# Patient Record
Sex: FEMALE | Race: WHITE | NOT HISPANIC OR LATINO | URBAN - NONMETROPOLITAN AREA
[De-identification: names, ages, dates, MRNs, and addresses within clinical notes are randomized per-mention and may not be internally consistent; named-entity substitution may affect disease eponyms.]

---

## 2018-07-19 NOTE — PATIENT DISCUSSION
Continue: Maxitrol (neomycin-polymyxin-dexameth): drops,suspension: 3.5-10,000-0.1 mg/mL-unit/mL-% 1 drop three times a day as directed into left eye 07-

## 2018-07-19 NOTE — PATIENT DISCUSSION
- Discussed that there is no proven benefit to using AREDS2 vitamins and that she may stop them if she wishes

## 2018-07-26 NOTE — PATIENT DISCUSSION
Continue: prednisolone acetate (prednisolone acetate): drops,suspension: 1% 1 drop three times a day as directed into left eye 07-

## 2018-12-31 PROBLEM — H35.372: Noted: 2018-12-31

## 2018-12-31 PROBLEM — H52.4: Noted: 2020-01-20

## 2018-12-31 PROBLEM — H43.813: Noted: 2020-01-20

## 2018-12-31 PROBLEM — Z96.1: Noted: 2020-01-20

## 2018-12-31 PROBLEM — E11.9: Noted: 2018-12-31

## 2018-12-31 PROBLEM — H04.123: Noted: 2018-12-31

## 2018-12-31 PROBLEM — H52.4: Noted: 2018-12-31

## 2018-12-31 PROBLEM — H10.423: Noted: 2020-01-20

## 2018-12-31 PROBLEM — Z96.1: Noted: 2018-12-31

## 2018-12-31 PROBLEM — H35.373: Noted: 2018-12-31

## 2018-12-31 PROBLEM — H43.813: Noted: 2018-12-31

## 2019-07-22 ENCOUNTER — IMPORTED ENCOUNTER (OUTPATIENT)
Dept: URBAN - NONMETROPOLITAN AREA CLINIC 1 | Facility: CLINIC | Age: 69
End: 2019-07-22

## 2019-07-22 PROCEDURE — 92014 COMPRE OPH EXAM EST PT 1/>: CPT

## 2019-07-22 PROCEDURE — 92250 FUNDUS PHOTOGRAPHY W/I&R: CPT

## 2019-07-22 NOTE — PATIENT DISCUSSION
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 Discussed the risk of diabetic damage of the retina with potential vision loss and the importance of routine follow-up. Emphasized strict blood sugar control. Optos done today no BDR noted Endocrinologist is Dr. Alisha Silva to monitor. ERM OSDiscussed diagnosis in detail with patient. OCT done previously stable findings. Optos done today stable findings. Will discuss AG use in the future/when changes are noted. Continue to monitor closely. AYAAN OU Lagophthalmous OD:  Discussed diagnosis in detail with patient. Sign/symptoms associated discussed including watering. Main watering happens while watching TV at night (OD>>OS). Start Thera Tears PRN sample given today. Continue to monitor PRN. Pseudophakia OU:   LenSx Toric OUDiscussed diagnosis in detail with patient. Both intraocular impants in place and stable. Continue to monitor. PVD OU: Discussed findings of exam in detail with the patient. The risk of retinal detachment in patients with PVDs was discussed with the patient and the warning signs of retinal detachment were carefully reviewed with the patient. The patient was warned to return to the office or contact the ophthalmologist on call immediately if they experience signs of retinal detachment. Continue to monitor. Presbyopia OUDiscussed refractive status in detail with patient. Continue to monitor.; Dr's Notes:   12/31/18DFE  12/31/18Optos 5/23/18OCT  12/31/18VF  6/4/15

## 2020-01-20 ENCOUNTER — IMPORTED ENCOUNTER (OUTPATIENT)
Dept: URBAN - NONMETROPOLITAN AREA CLINIC 1 | Facility: CLINIC | Age: 70
End: 2020-01-20

## 2020-01-20 PROBLEM — H43.813: Noted: 2020-01-20

## 2020-01-20 PROBLEM — H52.4: Noted: 2020-01-20

## 2020-01-20 PROBLEM — H10.423: Noted: 2020-01-20

## 2020-01-20 PROBLEM — E11.9: Noted: 2018-12-31

## 2020-01-20 PROBLEM — Z96.1: Noted: 2020-01-20

## 2020-01-20 PROBLEM — H35.373: Noted: 2018-12-31

## 2020-01-20 PROBLEM — H04.123: Noted: 2018-12-31

## 2020-01-20 PROCEDURE — 92134 CPTRZ OPH DX IMG PST SGM RTA: CPT

## 2020-01-20 PROCEDURE — 99214 OFFICE O/P EST MOD 30 MIN: CPT

## 2020-01-20 NOTE — PATIENT DISCUSSION
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 Discussed the risk of diabetic damage of the retina with potential vision loss and the importance of routine follow-up. Emphasized strict blood sugar control. Endocrinologist is Dr. Gracia Kirkpatrick will send notes todayContinue to monitor. ERM OSDiscussed diagnosis in detail with patient. OCT done today stable findings. Will discuss AG use in the future/when changes are noted. Continue to monitor closely. AYAAN OU Lagophthalmous OD:  Discussed diagnosis in detail with patient. Sign/symptoms associated discussed. 2+papillae noted today. Start Pazeo QD OU X 2 weeks [facility] Thera Tears PRNContinue to monitor PRN. Pseudophakia OU:  Discussed diagnosis in detail with patient. Both intraocular impants in place and stable. Continue to monitor. PVD OU: Discussed findings of exam in detail with the patient. The risk of retinal detachment in patients with PVDs was discussed with the patient and the warning signs of retinal detachment were carefully reviewed with the patient. The patient was warned to return to the office or contact the ophthalmologist on call immediately if they experience signs of retinal detachment. Continue to monitor. Presbyopia OUDiscussed refractive status in detail with patient. Continue to monitor.; 's Notes: MR  12/31/18DFE  1/20/20Optos 7/22/19OCT  1/20/20

## 2020-10-26 ENCOUNTER — IMPORTED ENCOUNTER (OUTPATIENT)
Dept: URBAN - NONMETROPOLITAN AREA CLINIC 1 | Facility: CLINIC | Age: 70
End: 2020-10-26

## 2020-10-26 PROCEDURE — 92250 FUNDUS PHOTOGRAPHY W/I&R: CPT

## 2020-10-26 PROCEDURE — 92014 COMPRE OPH EXAM EST PT 1/>: CPT

## 2020-10-26 NOTE — PATIENT DISCUSSION
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 Discussed the risk of diabetic damage of the retina with potential vision loss and the importance of routine follow-up. Emphasized strict blood sugar control. Optos done today; no BDR OU. Endocrinologist is Dr. Leslie Flores will send notes today. Continue to monitor. ERM OSDiscussed diagnosis in detail with patient. Optos done today stable findings. Will discuss AG use in the future/when changes are noted. Continue to monitor closely. AYAAN OU Lagophthalmous OD:  Discussed diagnosis in detail with patient. Sign/symptoms associated discussed. Continue Pazeo PRN. Continue Thera Tears PRNContinue to monitor PRN. Pseudophakia OU:  Discussed diagnosis in detail with patient. Both intraocular impants in place and stable. Continue to monitor. PVD OU: Discussed findings of exam in detail with the patient. The risk of retinal detachment in patients with PVDs was discussed with the patient and the warning signs of retinal detachment were carefully reviewed with the patient. The patient was warned to return to the office or contact the ophthalmologist on call immediately if they experience signs of retinal detachment. Optos done today; no holes tears or detachments OU. Continue to monitor. Presbyopia OUDiscussed refractive status in detail with patient. Continue to monitor.; 's Notes:   12/31/18DFE  10/26/20Optos 10/26/20OCT  1/20/20

## 2021-04-26 ENCOUNTER — IMPORTED ENCOUNTER (OUTPATIENT)
Dept: URBAN - NONMETROPOLITAN AREA CLINIC 1 | Facility: CLINIC | Age: 71
End: 2021-04-26

## 2021-04-26 PROCEDURE — 92014 COMPRE OPH EXAM EST PT 1/>: CPT

## 2021-04-26 PROCEDURE — 92134 CPTRZ OPH DX IMG PST SGM RTA: CPT

## 2021-04-26 NOTE — PATIENT DISCUSSION
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 Discussed the risk of diabetic damage of the retina with potential vision loss and the importance of routine follow-up. Emphasized strict blood sugar control. Endocrinologist is Dr. Graciela Lorenzo will send notes today. Continue to monitor. ERM OSDiscussed diagnosis in detail with patient. Optos done previously stable findings. OCT done today; stable from previous. Will discuss AG use in the future/when changes are noted. Continue to monitor closely. AYAAN OU Lagophthalmous OD:  Discussed diagnosis in detail with patient. Sign/symptoms associated discussed. Continue Pazeo PRN. Continue Thera Tears PRNContinue to monitor PRN. Pseudophakia OU:  Discussed diagnosis in detail with patient. Both intraocular impants in place and stable. Continue to monitor. PVD OU: Discussed findings of exam in detail with the patient. The risk of retinal detachment in patients with PVDs was discussed with the patient and the warning signs of retinal detachment were carefully reviewed with the patient. The patient was warned to return to the office or contact the ophthalmologist on call immediately if they experience signs of retinal detachment. Continue to monitor. Presbyopia OUDiscussed refractive status in detail with patient. New glasses Rx given today.  Continue to monitor.; 's Notes: MR  4/26/21DFE  4/26/21Optos 10/26/20OCT  4/26/21

## 2021-10-29 ENCOUNTER — IMPORTED ENCOUNTER (OUTPATIENT)
Dept: URBAN - NONMETROPOLITAN AREA CLINIC 1 | Facility: CLINIC | Age: 71
End: 2021-10-29

## 2021-10-29 PROCEDURE — 92250 FUNDUS PHOTOGRAPHY W/I&R: CPT

## 2021-10-29 PROCEDURE — 99214 OFFICE O/P EST MOD 30 MIN: CPT

## 2021-10-29 NOTE — PATIENT DISCUSSION
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 Discussed the risk of diabetic damage of the retina with potential vision loss and the importance of routine follow-up. Emphasized strict blood sugar control. Optos done today; no BDR OU. Endocrinologist is Dr. Anthony Hicks will send notes today. Continue to monitor. ERM OSDiscussed diagnosis in detail with patient. Optos done today stable findings. OCT done previously; stable from previous. Will discuss AG use in the future/when changes are noted. Continue to monitor closely. RTC in 6 months with OCT AYAAN OU Lagophthalmous OD:  Discussed diagnosis in detail with patient. Sign/symptoms associated discussed. Continue Pazeo PRN. Continue Thera Tears PRNContinue to monitor PRN. Pseudophakia OU:  Discussed diagnosis in detail with patient. Both intraocular impants in place and stable. Continue to monitor. PVD OU: Discussed findings of exam in detail with the patient. The risk of retinal detachment in patients with PVDs was discussed with the patient and the warning signs of retinal detachment were carefully reviewed with the patient. The patient was warned to return to the office or contact the ophthalmologist on call immediately if they experience signs of retinal detachment. Continue to monitor. Presbyopia OUDiscussed refractive status in detail with patient. Continue to monitor.; 's Notes: MR  4/26/21DFE   10/29/21Optos 10/29/21OCT  4/26/21 Body Location Override (Optional - Billing Will Still Be Based On Selected Body Map Location If Applicable): right suprabrow

## 2022-04-09 ASSESSMENT — VISUAL ACUITY
OS_SC: 20/30-2
OS_SC: 20/30
OD_SC: 20/20
OD_SC: 20/20-
OS_SC: 20/25+
OS_SC: 20/30-
OD_SC: 20/20
OS_SC: 20/25+

## 2022-04-09 ASSESSMENT — TONOMETRY
OS_IOP_MMHG: 09
OD_IOP_MMHG: 12
OS_IOP_MMHG: 13
OD_IOP_MMHG: 09
OS_IOP_MMHG: 12
OS_IOP_MMHG: 12
OD_IOP_MMHG: 13
OS_IOP_MMHG: 13

## 2024-04-08 ENCOUNTER — FOLLOW UP (OUTPATIENT)
Dept: URBAN - NONMETROPOLITAN AREA CLINIC 1 | Facility: CLINIC | Age: 74
End: 2024-04-08

## 2024-04-08 DIAGNOSIS — H43.813: ICD-10-CM

## 2024-04-08 DIAGNOSIS — H04.123: ICD-10-CM

## 2024-04-08 DIAGNOSIS — H35.373: ICD-10-CM

## 2024-04-08 DIAGNOSIS — H52.4: ICD-10-CM

## 2024-04-08 DIAGNOSIS — E11.9: ICD-10-CM

## 2024-04-08 PROCEDURE — 92134 CPTRZ OPH DX IMG PST SGM RTA: CPT

## 2024-04-08 PROCEDURE — 92015 DETERMINE REFRACTIVE STATE: CPT

## 2024-04-08 PROCEDURE — 92014 COMPRE OPH EXAM EST PT 1/>: CPT

## 2024-04-08 ASSESSMENT — TONOMETRY
OS_IOP_MMHG: 14
OD_IOP_MMHG: 14

## 2024-04-08 ASSESSMENT — VISUAL ACUITY
OU_CC: 20/20-2
OS_CC: 20/32-2
OD_CC: 20/29+1

## 2024-05-20 ENCOUNTER — CONTACT LENSES/GLASSES VISIT (OUTPATIENT)
Dept: URBAN - NONMETROPOLITAN AREA CLINIC 1 | Facility: CLINIC | Age: 74
End: 2024-05-20

## 2024-05-20 DIAGNOSIS — H52.4: ICD-10-CM

## 2024-05-20 PROCEDURE — 92015GRNC REFRACTION GLASSES RECHECK NO CHARGE

## 2024-05-20 ASSESSMENT — VISUAL ACUITY
OS_CC: 20/20
OD_CC: 20/20

## 2025-05-22 ENCOUNTER — COMPREHENSIVE EXAM (OUTPATIENT)
Age: 75
End: 2025-05-22

## 2025-05-22 DIAGNOSIS — H04.123: ICD-10-CM

## 2025-05-22 DIAGNOSIS — H35.373: ICD-10-CM

## 2025-05-22 DIAGNOSIS — Z96.1: ICD-10-CM

## 2025-05-22 DIAGNOSIS — H10.423: ICD-10-CM

## 2025-05-22 DIAGNOSIS — H43.813: ICD-10-CM

## 2025-05-22 DIAGNOSIS — E11.9: ICD-10-CM

## 2025-05-22 PROCEDURE — 92134 CPTRZ OPH DX IMG PST SGM RTA: CPT

## 2025-05-22 PROCEDURE — 99214 OFFICE O/P EST MOD 30 MIN: CPT
